# Patient Record
Sex: FEMALE | NOT HISPANIC OR LATINO | Employment: OTHER | URBAN - NONMETROPOLITAN AREA
[De-identification: names, ages, dates, MRNs, and addresses within clinical notes are randomized per-mention and may not be internally consistent; named-entity substitution may affect disease eponyms.]

---

## 2023-09-03 PROBLEM — Z85.828 HISTORY OF SKIN CANCER: Status: ACTIVE | Noted: 2023-09-03

## 2023-09-03 PROBLEM — E66.3 OVERWEIGHT WITH BODY MASS INDEX (BMI) OF 26 TO 26.9 IN ADULT: Status: ACTIVE | Noted: 2023-09-03

## 2023-09-03 PROBLEM — I34.0 MITRAL REGURGITATION: Status: ACTIVE | Noted: 2023-09-03

## 2023-09-03 PROBLEM — I49.3 PVC (PREMATURE VENTRICULAR CONTRACTION): Status: ACTIVE | Noted: 2023-09-03

## 2023-09-03 PROBLEM — I49.1 APC (ATRIAL PREMATURE CONTRACTIONS): Status: ACTIVE | Noted: 2023-09-03

## 2023-09-03 PROBLEM — R00.2 PALPITATION: Status: ACTIVE | Noted: 2023-09-03

## 2023-09-03 RX ORDER — LISINOPRIL 10 MG/1
1 TABLET ORAL DAILY
COMMUNITY
End: 2023-10-10 | Stop reason: SDUPTHER

## 2023-09-03 RX ORDER — ECHINACEA PURPUREA EXTRACT 125 MG
TABLET ORAL
COMMUNITY

## 2023-10-10 ENCOUNTER — OFFICE VISIT (OUTPATIENT)
Dept: CARDIOLOGY | Facility: CLINIC | Age: 88
End: 2023-10-10
Payer: MEDICARE

## 2023-10-10 VITALS
BODY MASS INDEX: 26.32 KG/M2 | DIASTOLIC BLOOD PRESSURE: 70 MMHG | HEART RATE: 92 BPM | HEIGHT: 57 IN | SYSTOLIC BLOOD PRESSURE: 136 MMHG | WEIGHT: 122 LBS

## 2023-10-10 DIAGNOSIS — I34.0 MITRAL VALVE INSUFFICIENCY, UNSPECIFIED ETIOLOGY: ICD-10-CM

## 2023-10-10 DIAGNOSIS — I10 ESSENTIAL HYPERTENSION: ICD-10-CM

## 2023-10-10 DIAGNOSIS — R00.2 PALPITATION: ICD-10-CM

## 2023-10-10 DIAGNOSIS — I49.3 PVC (PREMATURE VENTRICULAR CONTRACTION): ICD-10-CM

## 2023-10-10 DIAGNOSIS — I49.1 APC (ATRIAL PREMATURE CONTRACTIONS): Primary | ICD-10-CM

## 2023-10-10 PROCEDURE — 1159F MED LIST DOCD IN RCRD: CPT | Performed by: INTERNAL MEDICINE

## 2023-10-10 PROCEDURE — 99213 OFFICE O/P EST LOW 20 MIN: CPT | Performed by: INTERNAL MEDICINE

## 2023-10-10 PROCEDURE — 3078F DIAST BP <80 MM HG: CPT | Performed by: INTERNAL MEDICINE

## 2023-10-10 PROCEDURE — 1036F TOBACCO NON-USER: CPT | Performed by: INTERNAL MEDICINE

## 2023-10-10 PROCEDURE — 3075F SYST BP GE 130 - 139MM HG: CPT | Performed by: INTERNAL MEDICINE

## 2023-10-10 RX ORDER — LISINOPRIL 10 MG/1
10 TABLET ORAL DAILY
Qty: 90 TABLET | Refills: 3 | Status: SHIPPED | OUTPATIENT
Start: 2023-10-10

## 2023-10-10 ASSESSMENT — PATIENT HEALTH QUESTIONNAIRE - PHQ9
SUM OF ALL RESPONSES TO PHQ9 QUESTIONS 1 AND 2: 0
2. FEELING DOWN, DEPRESSED OR HOPELESS: NOT AT ALL
1. LITTLE INTEREST OR PLEASURE IN DOING THINGS: NOT AT ALL

## 2023-10-10 NOTE — PROGRESS NOTES
"Subjective   Poonam Bearden is a 88 y.o. female       Chief Complaint    Follow-up          HPI     Patient returns in follow-up of problems as noted.  She is done well.  She denies any of the arrhythmia symptomatology that prompted her original evaluation.  Likewise her hypertension is now well controlled and appears that no adjustments in therapy are necessary.    She was educated regarding cardiac signs and symptoms watch for and encouraged to call if they arise or occur.  A modest diet was also mentioned.    Review of Systems   All other systems reviewed and are negative.               Objective   Physical Exam  Constitutional:       Appearance: Normal appearance. She is normal weight.   HENT:      Nose: Nose normal.   Neck:      Vascular: No carotid bruit.   Cardiovascular:      Rate and Rhythm: Normal rate.      Pulses: Normal pulses.      Heart sounds: Normal heart sounds.   Pulmonary:      Effort: Pulmonary effort is normal.   Abdominal:      General: Bowel sounds are normal.      Palpations: Abdomen is soft.   Genitourinary:     Rectum: Normal.   Musculoskeletal:         General: Normal range of motion.      Cervical back: Normal range of motion.      Right lower leg: No edema.      Left lower leg: No edema.   Skin:     General: Skin is warm and dry.   Neurological:      General: No focal deficit present.      Mental Status: She is alert.   Psychiatric:         Mood and Affect: Mood normal.         Behavior: Behavior normal.         Thought Content: Thought content normal.         Judgment: Judgment normal.         Visit Vitals  /70 (BP Location: Right arm, Patient Position: Sitting)   Pulse 92   Ht 1.448 m (4' 9\")   Wt 55.3 kg (122 lb)   BMI 26.40 kg/m²   Smoking Status Never   BSA 1.49 m²                 Assessment/Plan   1. APC (atrial premature contractions)        2. PVC (premature ventricular contraction)        3. Palpitation        4. Mitral valve insufficiency, unspecified etiology      "   5. Essential hypertension           1. APC (atrial premature contractions)        2. PVC (premature ventricular contraction)        3. Palpitation        4. Mitral valve insufficiency, unspecified etiology        5. Essential hypertension

## 2024-10-15 ENCOUNTER — APPOINTMENT (OUTPATIENT)
Dept: CARDIOLOGY | Facility: CLINIC | Age: 89
End: 2024-10-15
Payer: MEDICARE

## 2024-12-31 ENCOUNTER — APPOINTMENT (OUTPATIENT)
Dept: CARDIOLOGY | Facility: CLINIC | Age: 89
End: 2024-12-31
Payer: MEDICARE

## 2025-02-17 ENCOUNTER — APPOINTMENT (OUTPATIENT)
Dept: CARDIOLOGY | Facility: CLINIC | Age: 89
End: 2025-02-17
Payer: MEDICARE

## 2025-02-21 ENCOUNTER — APPOINTMENT (OUTPATIENT)
Dept: CARDIOLOGY | Facility: CLINIC | Age: 89
End: 2025-02-21
Payer: MEDICARE

## 2025-03-10 ENCOUNTER — APPOINTMENT (OUTPATIENT)
Dept: CARDIOLOGY | Facility: CLINIC | Age: 89
End: 2025-03-10
Payer: MEDICARE

## 2025-03-11 ENCOUNTER — APPOINTMENT (OUTPATIENT)
Dept: CARDIOLOGY | Facility: CLINIC | Age: 89
End: 2025-03-11
Payer: MEDICARE

## 2025-05-27 ENCOUNTER — APPOINTMENT (OUTPATIENT)
Dept: CARDIOLOGY | Facility: CLINIC | Age: 89
End: 2025-05-27
Payer: MEDICARE

## 2025-05-27 VITALS
DIASTOLIC BLOOD PRESSURE: 64 MMHG | SYSTOLIC BLOOD PRESSURE: 128 MMHG | HEIGHT: 57 IN | HEART RATE: 72 BPM | BODY MASS INDEX: 25.03 KG/M2 | WEIGHT: 116 LBS

## 2025-05-27 DIAGNOSIS — I10 ESSENTIAL HYPERTENSION: ICD-10-CM

## 2025-05-27 DIAGNOSIS — I49.1 APC (ATRIAL PREMATURE CONTRACTIONS): Primary | ICD-10-CM

## 2025-05-27 DIAGNOSIS — I10 ESSENTIAL (PRIMARY) HYPERTENSION: ICD-10-CM

## 2025-05-27 DIAGNOSIS — I34.0 NONRHEUMATIC MITRAL VALVE REGURGITATION: ICD-10-CM

## 2025-05-27 PROCEDURE — 1159F MED LIST DOCD IN RCRD: CPT | Performed by: NURSE PRACTITIONER

## 2025-05-27 PROCEDURE — 3078F DIAST BP <80 MM HG: CPT | Performed by: NURSE PRACTITIONER

## 2025-05-27 PROCEDURE — 3074F SYST BP LT 130 MM HG: CPT | Performed by: NURSE PRACTITIONER

## 2025-05-27 PROCEDURE — 1160F RVW MEDS BY RX/DR IN RCRD: CPT | Performed by: NURSE PRACTITIONER

## 2025-05-27 PROCEDURE — 99213 OFFICE O/P EST LOW 20 MIN: CPT | Performed by: NURSE PRACTITIONER

## 2025-05-27 RX ORDER — LISINOPRIL 10 MG/1
10 TABLET ORAL DAILY
Qty: 90 TABLET | Refills: 3 | Status: SHIPPED | OUTPATIENT
Start: 2025-05-27

## 2025-05-27 NOTE — LETTER
"May 28, 2025     Josie Mulligan DO  [address]  [facility] [address]    Patient: Poonam Bearden   YOB: 1935   Date of Visit: 5/27/2025       Dear Dr. Josie Mulligan DO:    Thank you for referring Poonam Bearden to me for evaluation. Below are my notes for this consultation.  If you have questions, please do not hesitate to call me. I look forward to following your patient along with you.       Sincerely,     Annita Mendez, APRN-CNP      CC: No Recipients  ______________________________________________________________________________________    Chief Complaint  \"I am doing okay\"    Reason for Visit  Annual follow-up.  Patient presents to the office today for outpatient follow-up for palpitations.  Last evaluated in clinic by Dr. Morgan October 2023.    Presents today ambulatory with cane and steady gait.  Accompanied by patient  Patient denies any hospitalizations or significant changes to interval medical history since last office follow-up.   She follows routinely with PCP.    History of Present Illness   Patient is a pleasant 89-year-old female who is followed annually Dr. Morgan due to palpitations.  Workup included a 2005 [facility] showing rare PVCs and PACs.  She had a cardiac cath with angiographically normal coronaries, LVEF 55-60%.  Mild MR 1+ by echo, 1-2+ by cardiac cath.  She presents to the office today or her activity is mostly limited due to scoliosis.  She does try to walk at 15-minute intervals throughout the day.  She likes to read.  She does ADLs and light housework.  She pushes a cart through the grocery store.  She denies any type of exertional chest pain, no palpitations.  No dizziness or lightheadedness.    Patient reports that overall has no complaint(s) of chest pain, chest pressure/discomfort, claudication, dyspnea, exertional chest pressure/discomfort, fatigue, irregular heart beat, and lower extremity edema    Daily activity:   < 4 " "METs  Denies any change in exercise capacity or functional tolerance since last office visit.    Review of Systems   Cardiovascular:  Negative for chest pain, dyspnea on exertion, irregular heartbeat, leg swelling, near-syncope, orthopnea, palpitations, paroxysmal nocturnal dyspnea and syncope.        Visit Vitals  /64 (BP Location: Left arm, Patient Position: Sitting)   Pulse 72   Ht 1.448 m (4' 9\")   Wt 52.6 kg (116 lb)   BMI 25.10 kg/m²   Smoking Status Never   BSA 1.45 m²     Physical Exam  Vitals and nursing note reviewed.   HENT:      Head: Normocephalic.   Cardiovascular:      Rate and Rhythm: Normal rate and regular rhythm.      Heart sounds: Murmur heard.      Systolic murmur is present with a grade of 2/6.   Pulmonary:      Effort: Pulmonary effort is normal.      Breath sounds: Normal breath sounds.   Abdominal:      Palpations: Abdomen is soft.   Musculoskeletal:      Right lower leg: No edema.      Left lower leg: No edema.   Skin:     General: Skin is warm and dry.   Neurological:      General: No focal deficit present.      Mental Status: She is alert.   Psychiatric:         Mood and Affect: Mood normal.         Behavior: Behavior normal.          ALLERGIES:  Patient has no known allergies.    Current Outpatient Medications   Medication Instructions   • acetaminophen (TYLENOL ORAL) 1-2 tablets, Every 6 hours PRN   • Echinacea purpurea extract (echinacea) 125 mg tablet Take by mouth.   • glucosamine/D3/boswellia nathan (GLUCOSAMINE DAILY COMPLEX ORAL) 1 tablet, 3 times daily   • lisinopril 10 mg, oral, Daily      Assessment:    Cardiac and Vasculature  Dec 2002 cardiac catheterization angiographically normal coronaries.    December 2003 TTE  LVEF 55 to 60%    Mitral regurgitation  December 2002 cardiac cath 1-2+ MR  December 2003 TTE 1+ MR    Essential (primary) hypertension  Optimal in the office    Plan:     Through informed decision making process incorporating patients unique circumstances, " the following treatment plan will be initiated:    1.  Prescription drug management of cardiovascular medication for efficacy, adherence to treatment, side effect assessment and polypharmacy. Current treatment clinically warranted and to continue without modifications.    2. Return for follow-up; in the interim, contact the office if new symptoms arise.  NP ted Mendez MSN, APRN-CNP, PMHNP-BC  [facility] [place]  [place], [place]     Please excuse any errors in grammar or translation related to this dictation. Voice recognition software was utilized to prepare this document.

## 2025-05-28 ASSESSMENT — ENCOUNTER SYMPTOMS
PALPITATIONS: 0
ORTHOPNEA: 0
SYNCOPE: 0
IRREGULAR HEARTBEAT: 0
DYSPNEA ON EXERTION: 0
NEAR-SYNCOPE: 0
PND: 0

## 2025-05-28 NOTE — PROGRESS NOTES
"Chief Complaint  \"I am doing okay\"    Reason for Visit  Annual follow-up.  Patient presents to the office today for outpatient follow-up for palpitations.  Last evaluated in clinic by Dr. Morgan October 2023.    Presents today ambulatory with cane and steady gait.  Accompanied by patient  Patient denies any hospitalizations or significant changes to interval medical history since last office follow-up.   She follows routinely with PCP.    History of Present Illness   Patient is a pleasant 89-year-old female who is followed annually Dr. Morgan due to palpitations.  Workup included a 2005 [facility] showing rare PVCs and PACs.  She had a cardiac cath with angiographically normal coronaries, LVEF 55-60%.  Mild MR 1+ by echo, 1-2+ by cardiac cath.  She presents to the office today or her activity is mostly limited due to scoliosis.  She does try to walk at 15-minute intervals throughout the day.  She likes to read.  She does ADLs and light housework.  She pushes a cart through the grocery store.  She denies any type of exertional chest pain, no palpitations.  No dizziness or lightheadedness.    Patient reports that overall has no complaint(s) of chest pain, chest pressure/discomfort, claudication, dyspnea, exertional chest pressure/discomfort, fatigue, irregular heart beat, and lower extremity edema    Daily activity:   < 4 METs  Denies any change in exercise capacity or functional tolerance since last office visit.    Review of Systems   Cardiovascular:  Negative for chest pain, dyspnea on exertion, irregular heartbeat, leg swelling, near-syncope, orthopnea, palpitations, paroxysmal nocturnal dyspnea and syncope.        Visit Vitals  /64 (BP Location: Left arm, Patient Position: Sitting)   Pulse 72   Ht 1.448 m (4' 9\")   Wt 52.6 kg (116 lb)   BMI 25.10 kg/m²   Smoking Status Never   BSA 1.45 m²     Physical Exam  Vitals and nursing note reviewed.   HENT:      Head: Normocephalic.   Cardiovascular:      Rate " and Rhythm: Normal rate and regular rhythm.      Heart sounds: Murmur heard.      Systolic murmur is present with a grade of 2/6.   Pulmonary:      Effort: Pulmonary effort is normal.      Breath sounds: Normal breath sounds.   Abdominal:      Palpations: Abdomen is soft.   Musculoskeletal:      Right lower leg: No edema.      Left lower leg: No edema.   Skin:     General: Skin is warm and dry.   Neurological:      General: No focal deficit present.      Mental Status: She is alert.   Psychiatric:         Mood and Affect: Mood normal.         Behavior: Behavior normal.          ALLERGIES:  Patient has no known allergies.    Current Outpatient Medications   Medication Instructions    acetaminophen (TYLENOL ORAL) 1-2 tablets, Every 6 hours PRN    Echinacea purpurea extract (echinacea) 125 mg tablet Take by mouth.    glucosamine/D3/boswellia nathan (GLUCOSAMINE DAILY COMPLEX ORAL) 1 tablet, 3 times daily    lisinopril 10 mg, oral, Daily      Assessment:    Cardiac and Vasculature  Dec 2002 cardiac catheterization angiographically normal coronaries.    December 2003 TTE  LVEF 55 to 60%    Mitral regurgitation  December 2002 cardiac cath 1-2+ MR  December 2003 TTE 1+ MR    Essential (primary) hypertension  Optimal in the office    Plan:     Through informed decision making process incorporating patients unique circumstances, the following treatment plan will be initiated:    1.  Prescription drug management of cardiovascular medication for efficacy, adherence to treatment, side effect assessment and polypharmacy. Current treatment clinically warranted and to continue without modifications.    2. Return for follow-up; in the interim, contact the office if new symptoms arise.  NP ted Mendez MSN, APRN-CNP, PMHNP-BC  [facility] [place]  [place], [place]     Please excuse any errors in grammar or translation related to this dictation. Voice recognition software was  utilized to prepare this document.

## 2025-05-28 NOTE — ASSESSMENT & PLAN NOTE
Dec 2002 cardiac catheterization angiographically normal coronaries.    December 2003 TTE  LVEF 55 to 60%

## 2026-05-27 ENCOUNTER — APPOINTMENT (OUTPATIENT)
Dept: CARDIOLOGY | Facility: CLINIC | Age: 89
End: 2026-05-27
Payer: MEDICARE